# Patient Record
Sex: MALE | Race: AMERICAN INDIAN OR ALASKA NATIVE | Employment: FULL TIME | URBAN - METROPOLITAN AREA
[De-identification: names, ages, dates, MRNs, and addresses within clinical notes are randomized per-mention and may not be internally consistent; named-entity substitution may affect disease eponyms.]

---

## 2020-07-23 ENCOUNTER — HOSPITAL ENCOUNTER (EMERGENCY)
Age: 50
Discharge: HOME | End: 2020-07-23
Attending: EMERGENCY MEDICINE

## 2020-07-23 VITALS
SYSTOLIC BLOOD PRESSURE: 164 MMHG | HEIGHT: 63 IN | BODY MASS INDEX: 28 KG/M2 | HEART RATE: 78 BPM | RESPIRATION RATE: 20 BRPM | DIASTOLIC BLOOD PRESSURE: 88 MMHG | WEIGHT: 158 LBS | OXYGEN SATURATION: 98 %

## 2020-07-23 LAB
ANION GAP SERPL CALCULATED.3IONS-SCNC: 14 MMOL/L (ref 7–16)
BASOPHILS ABSOLUTE: 0.07 E9/L (ref 0–0.2)
BASOPHILS RELATIVE PERCENT: 0.7 % (ref 0–2)
BUN BLDV-MCNC: 9 MG/DL (ref 6–20)
CALCIUM SERPL-MCNC: 9.1 MG/DL (ref 8.6–10.2)
CHLORIDE BLD-SCNC: 96 MMOL/L (ref 98–107)
CO2: 25 MMOL/L (ref 22–29)
CREAT SERPL-MCNC: 0.7 MG/DL (ref 0.7–1.2)
EOSINOPHILS ABSOLUTE: 0.2 E9/L (ref 0.05–0.5)
EOSINOPHILS RELATIVE PERCENT: 1.9 % (ref 0–6)
GFR AFRICAN AMERICAN: >60
GFR NON-AFRICAN AMERICAN: >60 ML/MIN/1.73
GLUCOSE BLD-MCNC: 90 MG/DL (ref 74–99)
HCT VFR BLD CALC: 46.7 % (ref 37–54)
HEMOGLOBIN: 15.9 G/DL (ref 12.5–16.5)
IMMATURE GRANULOCYTES #: 0.04 E9/L
IMMATURE GRANULOCYTES %: 0.4 % (ref 0–5)
LYMPHOCYTES ABSOLUTE: 2.14 E9/L (ref 1.5–4)
LYMPHOCYTES RELATIVE PERCENT: 20.4 % (ref 20–42)
MCH RBC QN AUTO: 35.3 PG (ref 26–35)
MCHC RBC AUTO-ENTMCNC: 34 % (ref 32–34.5)
MCV RBC AUTO: 103.8 FL (ref 80–99.9)
MONOCYTES ABSOLUTE: 0.85 E9/L (ref 0.1–0.95)
MONOCYTES RELATIVE PERCENT: 8.1 % (ref 2–12)
NEUTROPHILS ABSOLUTE: 7.18 E9/L (ref 1.8–7.3)
NEUTROPHILS RELATIVE PERCENT: 68.5 % (ref 43–80)
PDW BLD-RTO: 11.9 FL (ref 11.5–15)
PLATELET # BLD: 215 E9/L (ref 130–450)
PMV BLD AUTO: 9 FL (ref 7–12)
POTASSIUM SERPL-SCNC: 5.1 MMOL/L (ref 3.5–5)
RBC # BLD: 4.5 E12/L (ref 3.8–5.8)
SODIUM BLD-SCNC: 135 MMOL/L (ref 132–146)
TROPONIN: <0.01 NG/ML (ref 0–0.03)
URIC ACID, SERUM: 2.8 MG/DL (ref 3.4–7)
WBC # BLD: 10.5 E9/L (ref 4.5–11.5)

## 2020-07-23 PROCEDURE — 36415 COLL VENOUS BLD VENIPUNCTURE: CPT

## 2020-07-23 PROCEDURE — 6360000002 HC RX W HCPCS: Performed by: EMERGENCY MEDICINE

## 2020-07-23 PROCEDURE — 80048 BASIC METABOLIC PNL TOTAL CA: CPT

## 2020-07-23 PROCEDURE — 93005 ELECTROCARDIOGRAM TRACING: CPT | Performed by: EMERGENCY MEDICINE

## 2020-07-23 PROCEDURE — 96374 THER/PROPH/DIAG INJ IV PUSH: CPT

## 2020-07-23 PROCEDURE — 84550 ASSAY OF BLOOD/URIC ACID: CPT

## 2020-07-23 PROCEDURE — 84484 ASSAY OF TROPONIN QUANT: CPT

## 2020-07-23 PROCEDURE — 85025 COMPLETE CBC W/AUTO DIFF WBC: CPT

## 2020-07-23 PROCEDURE — 99283 EMERGENCY DEPT VISIT LOW MDM: CPT

## 2020-07-23 PROCEDURE — 6370000000 HC RX 637 (ALT 250 FOR IP): Performed by: EMERGENCY MEDICINE

## 2020-07-23 RX ORDER — CYCLOBENZAPRINE HCL 10 MG
10 TABLET ORAL 3 TIMES DAILY PRN
Qty: 21 TABLET | Refills: 0 | Status: SHIPPED | OUTPATIENT
Start: 2020-07-23 | End: 2020-08-02

## 2020-07-23 RX ORDER — KETOROLAC TROMETHAMINE 30 MG/ML
30 INJECTION, SOLUTION INTRAMUSCULAR; INTRAVENOUS ONCE
Status: COMPLETED | OUTPATIENT
Start: 2020-07-23 | End: 2020-07-23

## 2020-07-23 RX ORDER — PREDNISONE 20 MG/1
TABLET ORAL
Qty: 18 TABLET | Refills: 0 | Status: SHIPPED | OUTPATIENT
Start: 2020-07-23 | End: 2020-08-02

## 2020-07-23 RX ORDER — DEXAMETHASONE SODIUM PHOSPHATE 10 MG/ML
10 INJECTION INTRAMUSCULAR; INTRAVENOUS ONCE
Status: DISCONTINUED | OUTPATIENT
Start: 2020-07-23 | End: 2020-07-23 | Stop reason: HOSPADM

## 2020-07-23 RX ORDER — ASPIRIN 81 MG/1
324 TABLET, CHEWABLE ORAL ONCE
Status: COMPLETED | OUTPATIENT
Start: 2020-07-23 | End: 2020-07-23

## 2020-07-23 RX ADMIN — KETOROLAC TROMETHAMINE 30 MG: 30 INJECTION, SOLUTION INTRAMUSCULAR; INTRAVENOUS at 09:29

## 2020-07-23 RX ADMIN — ASPIRIN 81 MG 324 MG: 81 TABLET ORAL at 09:29

## 2020-07-23 ASSESSMENT — PAIN SCALES - GENERAL
PAINLEVEL_OUTOF10: 8
PAINLEVEL_OUTOF10: 8

## 2020-07-23 ASSESSMENT — ENCOUNTER SYMPTOMS
SHORTNESS OF BREATH: 0
ABDOMINAL PAIN: 0
CHEST TIGHTNESS: 0

## 2020-07-23 ASSESSMENT — PAIN DESCRIPTION - ORIENTATION: ORIENTATION: RIGHT

## 2020-07-23 ASSESSMENT — PAIN DESCRIPTION - LOCATION: LOCATION: ELBOW

## 2020-07-23 NOTE — ED PROVIDER NOTES
82-year-old male presenting with concern about tingling and a burning sensation down his right arm. He says it starts about the mid humerus and goes down the rest of his arm. He is concerned about gout because his family has a history of gout. He has no redness or significant edema, he is not tender to the touch. He is right-handed, he works as a , arms are frequently above his head outstretched to his sides as well. He does a lot of lifting at work also. All of this discomfort is been going on for about 2 to 3 weeks. He says it was worse this morning when he woke up. He felt significant more discomfort. No chest pain or shortness of breath. He does have a history of stenting in his left leg it [place]. He is not complaining of any abdominal or leg discomfort at this time. So far nothing has improved this or worsened it, it has been gradual, worsening, moderate to severe, ongoing for over 2 weeks, associated with this burning sensation. Compartments are soft, radial pulse intact, brisk capillary refill, no concern for acute vascular occlusion. History reviewed. No pertinent family history. Past Surgical History:   Procedure Laterality Date    VASCULAR SURGERY      stent right leg       Review of Systems   Constitutional: Negative for chills and fever. HENT: Negative. Respiratory: Negative for chest tightness and shortness of breath. Gastrointestinal: Negative for abdominal pain. Neurological:        Tingling and burning pain down the right arm   All other systems reviewed and are negative. Physical Exam  Constitutional:       General: He is not in acute distress. Appearance: He is well-developed. HENT:      Head: Normocephalic and atraumatic. Eyes:      Pupils: Pupils are equal, round, and reactive to light. Neck:      Musculoskeletal: Normal range of motion and neck supple. Thyroid: No thyromegaly. Cardiovascular:      Rate and Rhythm: Normal rate and regular rhythm. Pulmonary:      Effort: Pulmonary effort is normal. No respiratory distress. Breath sounds: Normal breath sounds. No wheezing. Abdominal:      General: There is no distension. Palpations: Abdomen is soft. There is no mass. Tenderness: There is no abdominal tenderness. There is no guarding or rebound. Musculoskeletal: Normal range of motion. General: No tenderness. Comments: Soft compartments of the arm, strong radial pulse present, brisk capillary refill, no erythema, no joint swelling or tenderness or redness   Skin:     General: Skin is warm and dry. Capillary Refill: Capillary refill takes less than 2 seconds. Findings: No erythema. Neurological:      Mental Status: He is alert and oriented to person, place, and time. Mental status is at baseline. Cranial Nerves: No cranial nerve deficit. Motor: No weakness. Procedures     MDM     ED Course as of Jul 25 0641   Thu Jul 23, 2020   0916 EKG: Interpreted by meSinus rhythm, rate 93, normal axis, no ST elevations or depressions, no T wave abnormalities. [SO]      ED Course User Index  [SO] Laci Lan DO       --------------------------------------------- PAST HISTORY ---------------------------------------------  Past Medical History:  has no past medical history on file. Past Surgical History:  has a past surgical history that includes vascular surgery. Social History:  reports that he has been smoking cigarettes. He has been smoking about 0.50 packs per day. He does not have any smokeless tobacco history on file. He reports current alcohol use. Family History: family history is not on file. The patients home medications have been reviewed.     Allergies: Latex    -------------------------------------------------- RESULTS -------------------------------------------------  Labs:  Results for orders placed or performed during the hospital encounter of 57/96/41   Basic metabolic panel   Result Value Ref Range    Sodium 135 132 - 146 mmol/L    Potassium 5.1 (H) 3.5 - 5.0 mmol/L    Chloride 96 (L) 98 - 107 mmol/L    CO2 25 22 - 29 mmol/L    Anion Gap 14 7 - 16 mmol/L    Glucose 90 74 - 99 mg/dL    BUN 9 6 - 20 mg/dL    CREATININE 0.7 0.7 - 1.2 mg/dL    GFR Non-African American >60 >=60 mL/min/1.73    GFR African American >60     Calcium 9.1 8.6 - 10.2 mg/dL   Uric acid   Result Value Ref Range    Uric Acid, Serum 2.8 (L) 3.4 - 7.0 mg/dL   CBC auto differential   Result Value Ref Range    WBC 10.5 4.5 - 11.5 E9/L    RBC 4.50 3.80 - 5.80 E12/L    Hemoglobin 15.9 12.5 - 16.5 g/dL    Hematocrit 46.7 37.0 - 54.0 %    .8 (H) 80.0 - 99.9 fL    MCH 35.3 (H) 26.0 - 35.0 pg    MCHC 34.0 32.0 - 34.5 %    RDW 11.9 11.5 - 15.0 fL    Platelets 665 088 - 173 E9/L    MPV 9.0 7.0 - 12.0 fL    Neutrophils % 68.5 43.0 - 80.0 %    Immature Granulocytes % 0.4 0.0 - 5.0 %    Lymphocytes % 20.4 20.0 - 42.0 %    Monocytes % 8.1 2.0 - 12.0 %    Eosinophils % 1.9 0.0 - 6.0 %    Basophils % 0.7 0.0 - 2.0 %    Neutrophils Absolute 7.18 1.80 - 7.30 E9/L    Immature Granulocytes # 0.04 E9/L    Lymphocytes Absolute 2.14 1.50 - 4.00 E9/L    Monocytes Absolute 0.85 0.10 - 0.95 E9/L    Eosinophils Absolute 0.20 0.05 - 0.50 E9/L    Basophils Absolute 0.07 0.00 - 0.20 E9/L   Troponin   Result Value Ref Range    Troponin <0.01 0.00 - 0.03 ng/mL   EKG 12 Lead   Result Value Ref Range    Ventricular Rate 93 BPM    Atrial Rate 93 BPM    P-R Interval 128 ms    QRS Duration 104 ms    Q-T Interval 364 ms    QTc Calculation (Bazett) 452 ms    P Axis 66 degrees    R Axis 34 degrees    T Axis 43 degrees       Radiology:  No orders to display       ------------------------- NURSING NOTES AND VITALS REVIEWED ---------------------------  Date / Time Roomed:  7/23/2020  8:53 AM  ED Bed Assignment:  MARY/MARY    The nursing notes within the ED encounter and vital signs as below have been reviewed. BP (!) 164/88   Pulse 78   Resp 20   Ht 5' 3\" (1.6 m)   Wt 158 lb (71.7 kg)   SpO2 98%   BMI 27.99 kg/m²   Oxygen Saturation Interpretation: Normal      ------------------------------------------ PROGRESS NOTES ------------------------------------------  I have spoken with the patient and discussed todays results, in addition to providing specific details for the plan of care and counseling regarding the diagnosis and prognosis. Their questions are answered at this time and they are agreeable with the plan. I discussed at length with them reasons for immediate return here for re evaluation. They will followup with primary care by calling their office tomorrow. --------------------------------- ADDITIONAL PROVIDER NOTES ---------------------------------  At this time the patient is without objective evidence of an acute process requiring hospitalization or inpatient management. They have remained hemodynamically stable throughout their entire ED visit and are stable for discharge with outpatient follow-up. The plan has been discussed in detail and they are aware of the specific conditions for emergent return, as well as the importance of follow-up. Discharge Medication List as of 7/23/2020 10:27 AM      START taking these medications    Details   predniSONE (DELTASONE) 20 MG tablet Sig.: Take 60mg  Po qd x 3 days, then 40mg po qd x3 days, then 20mg po qd x 3 days. QS x 9 days, Disp-18 tablet,R-0Print      cyclobenzaprine (FLEXERIL) 10 MG tablet Take 1 tablet by mouth 3 times daily as needed for Muscle spasms, Disp-21 tablet,R-0Print             Diagnosis:  1. Radiculopathy of cervical spine        Disposition:  Patient's disposition: Discharge to home  Patient's condition is stable.              Farhat Hodgson DO  07/25/20 [id number]

## 2020-07-24 LAB
EKG ATRIAL RATE: 93 BPM
EKG P AXIS: 66 DEGREES
EKG P-R INTERVAL: 128 MS
EKG Q-T INTERVAL: 364 MS
EKG QRS DURATION: 104 MS
EKG QTC CALCULATION (BAZETT): 452 MS
EKG R AXIS: 34 DEGREES
EKG T AXIS: 43 DEGREES
EKG VENTRICULAR RATE: 93 BPM

## 2020-07-24 PROCEDURE — 93010 ELECTROCARDIOGRAM REPORT: CPT | Performed by: INTERNAL MEDICINE

## 2021-01-11 ENCOUNTER — HISTORICAL (OUTPATIENT)
Dept: ADMINISTRATIVE | Facility: HOSPITAL | Age: 51
End: 2021-01-11

## 2021-01-12 ENCOUNTER — HISTORICAL (OUTPATIENT)
Dept: ADMINISTRATIVE | Facility: HOSPITAL | Age: 51
End: 2021-01-12

## 2021-04-05 ENCOUNTER — HISTORICAL (OUTPATIENT)
Dept: ADMINISTRATIVE | Facility: HOSPITAL | Age: 51
End: 2021-04-05

## 2021-05-04 DIAGNOSIS — N41.0 ACUTE PROSTATITIS: Primary | ICD-10-CM

## 2021-05-05 DIAGNOSIS — G56.00 CARPAL TUNNEL SYNDROME: Primary | ICD-10-CM

## 2021-05-13 ENCOUNTER — OFFICE VISIT (OUTPATIENT)
Dept: UROLOGY | Facility: CLINIC | Age: 51
End: 2021-05-13
Payer: MEDICAID

## 2021-05-13 ENCOUNTER — TELEPHONE (OUTPATIENT)
Dept: UROLOGY | Facility: CLINIC | Age: 51
End: 2021-05-13

## 2021-05-13 VITALS — WEIGHT: 176 LBS | BODY MASS INDEX: 32.39 KG/M2 | RESPIRATION RATE: 18 BRPM | HEIGHT: 62 IN

## 2021-05-13 DIAGNOSIS — Z80.42 FAMILY HISTORY OF PROSTATE CANCER: ICD-10-CM

## 2021-05-13 DIAGNOSIS — R31.29 HEMATURIA, MICROSCOPIC: ICD-10-CM

## 2021-05-13 DIAGNOSIS — N41.0 ACUTE PROSTATITIS: Primary | ICD-10-CM

## 2021-05-13 LAB — POC RESIDUAL URINE VOLUME: 0 ML (ref 0–100)

## 2021-05-13 PROCEDURE — 99204 PR OFFICE/OUTPT VISIT, NEW, LEVL IV, 45-59 MIN: ICD-10-PCS | Mod: S$GLB,,, | Performed by: NURSE PRACTITIONER

## 2021-05-13 PROCEDURE — 99204 OFFICE O/P NEW MOD 45 MIN: CPT | Mod: S$GLB,,, | Performed by: NURSE PRACTITIONER

## 2021-05-13 PROCEDURE — 51798 POCT BLADDER SCAN: ICD-10-PCS | Mod: S$GLB,,, | Performed by: NURSE PRACTITIONER

## 2021-05-13 PROCEDURE — 51798 US URINE CAPACITY MEASURE: CPT | Mod: S$GLB,,, | Performed by: NURSE PRACTITIONER

## 2021-05-13 RX ORDER — FUROSEMIDE 40 MG/1
40 TABLET ORAL DAILY
COMMUNITY
Start: 2021-05-11

## 2021-05-13 RX ORDER — CARVEDILOL 12.5 MG/1
12.5 TABLET ORAL
COMMUNITY
Start: 2021-04-05

## 2021-05-13 RX ORDER — SPIRONOLACTONE 25 MG/1
12.5 TABLET ORAL DAILY
COMMUNITY
Start: 2021-05-11

## 2021-05-13 RX ORDER — SACUBITRIL AND VALSARTAN 49; 51 MG/1; MG/1
1 TABLET, FILM COATED ORAL 2 TIMES DAILY
COMMUNITY
Start: 2021-05-11

## 2021-05-13 RX ORDER — CARVEDILOL 12.5 MG/1
12.5 TABLET ORAL 2 TIMES DAILY
COMMUNITY
Start: 2021-05-11

## 2021-05-13 RX ORDER — ASPIRIN 81 MG/1
81 TABLET ORAL
COMMUNITY
Start: 2021-04-05

## 2021-05-13 RX ORDER — ESCITALOPRAM OXALATE 10 MG/1
10 TABLET ORAL DAILY
COMMUNITY
Start: 2021-05-11

## 2021-05-13 RX ORDER — TAMSULOSIN HYDROCHLORIDE 0.4 MG/1
1 CAPSULE ORAL DAILY
COMMUNITY
Start: 2021-05-11

## 2021-05-13 RX ORDER — ATORVASTATIN CALCIUM 40 MG/1
40 TABLET, FILM COATED ORAL NIGHTLY
COMMUNITY
Start: 2021-05-11

## 2021-05-13 RX ORDER — CIPROFLOXACIN 500 MG/1
500 TABLET ORAL 2 TIMES DAILY
Qty: 56 TABLET | Refills: 0 | Status: SHIPPED | OUTPATIENT
Start: 2021-05-13 | End: 2021-05-13

## 2021-05-13 RX ORDER — SULFAMETHOXAZOLE AND TRIMETHOPRIM 800; 160 MG/1; MG/1
1 TABLET ORAL 2 TIMES DAILY
Qty: 56 TABLET | Refills: 0 | Status: SHIPPED | OUTPATIENT
Start: 2021-05-13 | End: 2021-06-10

## 2021-05-17 ENCOUNTER — OFFICE VISIT (OUTPATIENT)
Dept: ORTHOPEDICS | Facility: CLINIC | Age: 51
End: 2021-05-17
Payer: MEDICAID

## 2021-05-17 DIAGNOSIS — G56.02 LEFT CARPAL TUNNEL SYNDROME: ICD-10-CM

## 2021-05-17 DIAGNOSIS — M67.332 TRANSIENT SYNOVITIS, LEFT WRIST: Primary | ICD-10-CM

## 2021-05-17 PROCEDURE — 99202 OFFICE O/P NEW SF 15 MIN: CPT | Mod: 25,S$GLB,, | Performed by: ORTHOPAEDIC SURGERY

## 2021-05-17 PROCEDURE — 20605 DRAIN/INJ JOINT/BURSA W/O US: CPT | Mod: LT,S$GLB,, | Performed by: ORTHOPAEDIC SURGERY

## 2021-05-17 PROCEDURE — 99202 PR OFFICE/OUTPT VISIT, NEW, LEVL II, 15-29 MIN: ICD-10-PCS | Mod: 25,S$GLB,, | Performed by: ORTHOPAEDIC SURGERY

## 2021-05-17 PROCEDURE — 20605 INTERMEDIATE JOINT ASPIRATION/INJECTION: L RADIOCARPAL: ICD-10-PCS | Mod: LT,S$GLB,, | Performed by: ORTHOPAEDIC SURGERY

## 2021-05-17 RX ORDER — HYDROCODONE BITARTRATE AND ACETAMINOPHEN 5; 325 MG/1; MG/1
TABLET ORAL
COMMUNITY
Start: 2021-05-14

## 2021-05-17 RX ORDER — IBUPROFEN 200 MG
TABLET ORAL
COMMUNITY
Start: 2021-05-13

## 2021-05-17 RX ORDER — TRAMADOL HYDROCHLORIDE 50 MG/1
TABLET ORAL
COMMUNITY
Start: 2021-04-12

## 2021-05-17 RX ORDER — ALBUTEROL SULFATE 90 UG/1
AEROSOL, METERED RESPIRATORY (INHALATION)
COMMUNITY

## 2021-06-01 ENCOUNTER — OFFICE VISIT (OUTPATIENT)
Dept: ORTHOPEDICS | Facility: CLINIC | Age: 51
End: 2021-06-01
Payer: MEDICAID

## 2021-06-01 DIAGNOSIS — G56.02 LEFT CARPAL TUNNEL SYNDROME: ICD-10-CM

## 2021-06-01 DIAGNOSIS — M19.132 POSTTRAUMATIC ARTHRITIS OF WRIST, LEFT: Primary | ICD-10-CM

## 2021-06-01 PROCEDURE — 99213 PR OFFICE/OUTPT VISIT, EST, LEVL III, 20-29 MIN: ICD-10-PCS | Mod: S$GLB,,, | Performed by: ORTHOPAEDIC SURGERY

## 2021-06-01 PROCEDURE — 99213 OFFICE O/P EST LOW 20 MIN: CPT | Mod: S$GLB,,, | Performed by: ORTHOPAEDIC SURGERY

## 2021-06-01 RX ORDER — DICLOFENAC SODIUM 75 MG/1
75 TABLET, DELAYED RELEASE ORAL 2 TIMES DAILY
Qty: 60 TABLET | Refills: 5 | Status: SHIPPED | OUTPATIENT
Start: 2021-06-01

## 2021-06-01 RX ORDER — ALPRAZOLAM 0.5 MG/1
TABLET ORAL
COMMUNITY
Start: 2021-05-24

## 2021-06-11 ENCOUNTER — OFFICE VISIT (OUTPATIENT)
Dept: UROLOGY | Facility: CLINIC | Age: 51
End: 2021-06-11
Payer: MEDICAID

## 2021-06-11 ENCOUNTER — TELEPHONE (OUTPATIENT)
Dept: UROLOGY | Facility: CLINIC | Age: 51
End: 2021-06-11

## 2021-06-11 VITALS
WEIGHT: 174 LBS | SYSTOLIC BLOOD PRESSURE: 105 MMHG | RESPIRATION RATE: 12 BRPM | HEIGHT: 62 IN | DIASTOLIC BLOOD PRESSURE: 61 MMHG | BODY MASS INDEX: 32.02 KG/M2 | HEART RATE: 81 BPM

## 2021-06-11 DIAGNOSIS — N41.0 ACUTE PROSTATITIS: ICD-10-CM

## 2021-06-11 DIAGNOSIS — R31.29 HEMATURIA, MICROSCOPIC: Primary | ICD-10-CM

## 2021-06-11 DIAGNOSIS — N40.0 BPH WITHOUT URINARY OBSTRUCTION: ICD-10-CM

## 2021-06-11 DIAGNOSIS — Z80.42 FAMILY HISTORY OF PROSTATE CANCER: ICD-10-CM

## 2021-06-11 LAB
POC RESIDUAL URINE VOLUME: 38 ML (ref 0–100)
PSA, DIAGNOSTIC: 0.36 NG/ML (ref 0–4)

## 2021-06-11 PROCEDURE — 99213 PR OFFICE/OUTPT VISIT, EST, LEVL III, 20-29 MIN: ICD-10-PCS | Mod: S$GLB,,, | Performed by: NURSE PRACTITIONER

## 2021-06-11 PROCEDURE — 99213 OFFICE O/P EST LOW 20 MIN: CPT | Mod: S$GLB,,, | Performed by: NURSE PRACTITIONER

## 2021-06-11 PROCEDURE — 51798 US URINE CAPACITY MEASURE: CPT | Mod: S$GLB,,, | Performed by: NURSE PRACTITIONER

## 2021-06-11 PROCEDURE — 51798 POCT BLADDER SCAN: ICD-10-PCS | Mod: S$GLB,,, | Performed by: NURSE PRACTITIONER

## 2021-10-13 ENCOUNTER — HISTORICAL (OUTPATIENT)
Dept: ADMINISTRATIVE | Facility: HOSPITAL | Age: 51
End: 2021-10-13

## 2021-10-29 ENCOUNTER — HISTORICAL (OUTPATIENT)
Dept: ADMINISTRATIVE | Facility: HOSPITAL | Age: 51
End: 2021-10-29

## 2021-11-29 ENCOUNTER — HISTORICAL (OUTPATIENT)
Dept: ADMINISTRATIVE | Facility: HOSPITAL | Age: 51
End: 2021-11-29

## 2021-11-29 ENCOUNTER — TELEPHONE (OUTPATIENT)
Dept: ORTHOPEDICS | Facility: CLINIC | Age: 51
End: 2021-11-29
Payer: MEDICAID

## 2022-01-19 ENCOUNTER — HISTORICAL (OUTPATIENT)
Dept: CARDIOLOGY | Facility: HOSPITAL | Age: 52
End: 2022-01-19

## 2022-04-07 ENCOUNTER — HISTORICAL (OUTPATIENT)
Dept: ADMINISTRATIVE | Facility: HOSPITAL | Age: 52
End: 2022-04-07
Payer: MEDICAID

## 2022-04-24 VITALS
HEIGHT: 62 IN | DIASTOLIC BLOOD PRESSURE: 74 MMHG | OXYGEN SATURATION: 98 % | BODY MASS INDEX: 32.25 KG/M2 | WEIGHT: 175.25 LBS | SYSTOLIC BLOOD PRESSURE: 118 MMHG

## 2022-12-13 ENCOUNTER — HISTORICAL (OUTPATIENT)
Dept: ADMINISTRATIVE | Facility: HOSPITAL | Age: 52
End: 2022-12-13

## 2023-01-26 ENCOUNTER — LAB VISIT (OUTPATIENT)
Dept: LAB | Facility: HOSPITAL | Age: 53
End: 2023-01-26
Attending: INTERNAL MEDICINE
Payer: MEDICAID

## 2023-01-26 DIAGNOSIS — I70.203 ATHEROSCLEROSIS OF ARTERY OF BOTH LOWER EXTREMITIES: ICD-10-CM

## 2023-01-26 DIAGNOSIS — E78.00 HYPERCHOLESTEREMIA: ICD-10-CM

## 2023-01-26 DIAGNOSIS — F10.20 ALCOHOLIC: Primary | ICD-10-CM

## 2023-01-26 DIAGNOSIS — I10 HYPERTENSION, UNSPECIFIED TYPE: ICD-10-CM

## 2023-01-26 LAB
ALBUMIN SERPL-MCNC: 3.9 G/DL (ref 3.4–5)
ALBUMIN/GLOB SERPL: 1.4 RATIO
ALP SERPL-CCNC: 94 UNIT/L (ref 50–144)
ALT SERPL-CCNC: 27 UNIT/L (ref 1–45)
AST SERPL-CCNC: 24 UNIT/L (ref 17–59)
BILIRUBIN DIRECT+TOT PNL SERPL-MCNC: 0.3 MG/DL (ref 0–1)
BUN SERPL-MCNC: 10 MG/DL (ref 7–20)
CALCIUM SERPL-MCNC: 8.9 MG/DL (ref 8.4–10.2)
CHLORIDE SERPL-SCNC: 104 MMOL/L (ref 98–110)
CO2 SERPL-SCNC: 29 MMOL/L (ref 21–32)
CREAT SERPL-MCNC: 0.66 MG/DL (ref 0.66–1.25)
GFR SERPLBLD CREATININE-BSD FMLA CKD-EPI: >90 MLS/MIN/1.73/M2
GLOBULIN SER-MCNC: 2.7 GM/DL (ref 2–3.9)
GLUCOSE SERPL-MCNC: 133 MG/DL (ref 70–115)
POTASSIUM SERPL-SCNC: 4.4 MMOL/L (ref 3.5–5.1)
PROT SERPL-MCNC: 6.6 GM/DL (ref 6.3–8.2)
SODIUM SERPL-SCNC: 138 MMOL/L (ref 135–145)

## 2023-01-26 PROCEDURE — 36415 COLL VENOUS BLD VENIPUNCTURE: CPT

## 2023-01-26 PROCEDURE — 80053 COMPREHEN METABOLIC PANEL: CPT

## 2023-02-15 PROBLEM — I42.9 CARDIOMYOPATHY: Status: ACTIVE | Noted: 2023-02-15

## 2023-02-15 PROBLEM — I25.10 ARTERIOSCLEROSIS OF CORONARY ARTERY: Status: ACTIVE | Noted: 2023-02-15

## 2023-02-15 PROBLEM — I42.0: Status: ACTIVE | Noted: 2023-02-15

## 2023-02-15 PROBLEM — E78.5 HYPERLIPIDEMIA: Status: ACTIVE | Noted: 2023-02-15

## 2023-02-15 PROBLEM — I73.9 PERIPHERAL ARTERIAL DISEASE: Status: ACTIVE | Noted: 2023-02-15

## 2023-02-15 PROBLEM — G47.33 OBSTRUCTIVE SLEEP APNEA SYNDROME: Status: ACTIVE | Noted: 2023-02-15

## 2023-02-15 PROBLEM — F10.20 ALCOHOLISM: Status: ACTIVE | Noted: 2023-02-15

## 2023-02-15 PROBLEM — G62.9 NEUROPATHY: Status: ACTIVE | Noted: 2023-02-15

## 2023-02-15 PROBLEM — I42.6 ALCOHOLIC CARDIOMYOPATHY: Status: ACTIVE | Noted: 2023-02-15

## 2023-02-15 PROBLEM — I50.22 CHRONIC SYSTOLIC HEART FAILURE: Status: ACTIVE | Noted: 2023-02-15

## 2023-02-15 PROBLEM — M19.049 ARTHRITIS OF HAND: Status: ACTIVE | Noted: 2023-02-15

## 2023-02-15 PROBLEM — F33.9 RECURRENT MAJOR DEPRESSIVE DISORDER: Status: ACTIVE | Noted: 2023-02-15

## 2023-02-15 PROBLEM — I26.99 PULMONARY EMBOLISM: Status: ACTIVE | Noted: 2023-02-15

## 2023-02-15 PROBLEM — E66.9 OBESITY: Status: ACTIVE | Noted: 2023-02-15

## 2023-02-15 PROBLEM — R00.0 TACHYARRHYTHMIA: Status: ACTIVE | Noted: 2023-02-15

## 2023-02-15 PROBLEM — I10 ESSENTIAL HYPERTENSION: Status: ACTIVE | Noted: 2023-02-15

## 2023-05-22 PROBLEM — I26.99 PULMONARY EMBOLISM: Status: RESOLVED | Noted: 2023-02-15 | Resolved: 2023-05-22
